# Patient Record
Sex: MALE | Race: WHITE | Employment: UNEMPLOYED | URBAN - METROPOLITAN AREA
[De-identification: names, ages, dates, MRNs, and addresses within clinical notes are randomized per-mention and may not be internally consistent; named-entity substitution may affect disease eponyms.]

---

## 2018-04-13 ENCOUNTER — HOSPITAL ENCOUNTER (EMERGENCY)
Age: 23
Discharge: HOME | End: 2018-04-13
Attending: EMERGENCY MEDICINE
Payer: SELF-PAY

## 2018-04-13 VITALS
DIASTOLIC BLOOD PRESSURE: 74 MMHG | BODY MASS INDEX: 30.1 KG/M2 | RESPIRATION RATE: 16 BRPM | OXYGEN SATURATION: 98 % | SYSTOLIC BLOOD PRESSURE: 134 MMHG | HEIGHT: 71 IN | TEMPERATURE: 98.1 F | WEIGHT: 215 LBS | HEART RATE: 60 BPM

## 2018-04-13 DIAGNOSIS — Z20.2 POSSIBLE EXPOSURE TO STD: Primary | ICD-10-CM

## 2018-04-13 PROCEDURE — 87491 CHLMYD TRACH DNA AMP PROBE: CPT | Performed by: EMERGENCY MEDICINE

## 2018-04-13 PROCEDURE — 99283 EMERGENCY DEPT VISIT LOW MDM: CPT | Performed by: EMERGENCY MEDICINE

## 2018-04-13 NOTE — DISCHARGE INSTRUCTIONS
Learning How to Use a Male Condom  What is a male condom? Condoms can be used to prevent pregnancy. They can also help protect against sexually transmitted infections (STIs). You must use a new condom every time you have sex. Condoms prevent pregnancy by keeping sperm and eggs apart. The condom holds the sperm so the sperm can't get into the vagina. A male condom is a tube of soft rubber or plastic with a closed end. It fits over the penis. There are many kinds of male condoms. Some condoms are lubricated. Some are ribbed. Most have a \"reservoir tip\" for holding the semen. You can also buy condoms of different sizes. How do you use a condom? Condoms work best if you follow these steps. · Use a new condom each time you have sex. · Check the condom's expiration date. Do not use it past that date. · When opening the condom wrapper, be sure not to poke a hole in the condom with your fingernails, teeth, or other sharp objects. · Put the condom on as soon as the penis is hard (erect) and before any sexual contact with your partner. ¨ First, hold the tip of the condom and squeeze out the air. This leaves room for the semen after you ejaculate. ¨ If you are not circumcised, pull down the loose skin from the head of the penis (foreskin) before you put on the condom. ¨ Hold on to the tip of the condom as you unroll the condom. Unroll it all the way down to the base of the penis. · After you ejaculate, hold on to the condom at the base of the penis, and withdraw from your partner while your penis is still erect. This will keep semen from spilling out of the condom. · Wash your hands after you handle a used condom. How do you buy and store condoms? · Male condoms may be available for free at family planning clinics. You can buy them without a prescription at drugstores, online, and in some grocery stores. · Keep condoms wrapped in their original packages until you are ready to use them.  Store them in a cool, dry place out of direct sunlight. · Don't keep rubber (latex) condoms in a glove compartment or other hot places for a long time. Heat weakens latex and increases the chance that the condom will break. · Don't use condoms in damaged packages. And don't use condoms that are brittle, sticky, or discolored, even if they are not past their expiration date. What else do you need to know? · To protect yourself and your partner from STIs, use a condom during vaginal, oral, or anal sex. · If the condom breaks or you think sperm may have leaked out into the vagina, the woman can use emergency contraception, such as the morning-after pill (Plan B). Emergency contraception can be used for up to 5 days after you have had sex. But it works best if you use it right away. · Use a male condom with another form of birth control. It's the best way to prevent pregnancy. ¨ You can use the condom with hormonal contraception, an intrauterine device (IUD), a diaphragm, a sponge, a shield, or a cervical cap. ¨ Don't use a male condom with a female condom. ¨ Use spermicide as its instructions say. Don't put spermicide inside a condom. · If you or your partner gets a rash or feels itchy after using a latex condom, talk to your doctor. You may have a latex allergy. Where can you learn more? Go to http://yenny-sierra.info/. Enter H470 in the search box to learn more about \"Learning How to Use a Male Condom. \"  Current as of: March 16, 2017  Content Version: 11.4  © 8246-1022 [employer]. Care instructions adapted under license by [employer] (which disclaims liability or warranty for this information). If you have questions about a medical condition or this instruction, always ask your healthcare professional. [address] any warranty or liability for your use of this information.

## 2018-04-13 NOTE — ED PROVIDER NOTES
HPI Comments: 23-year-old male presents with concern for STD. His girlfriend was treated for Chlamydia in August 2017. He states he was never tested or treated and now that she found out she was pregnant yesterday, he would like to be tested as he is concerned they may have reinfected each other. Neither him or his girlfriend have any symptoms. He has no abdominal pain or discharge and swelling and rash. Patient is a 20 y.o. male presenting with STD exposure. The history is provided by the patient. Exposure to STD   Pertinent negatives include no abdominal pain and no penile pain. History reviewed. No pertinent past medical history. History reviewed. No pertinent surgical history. History reviewed. No pertinent family history. Social History     Social History    Marital status: SINGLE     Spouse name: N/A    Number of children: N/A    Years of education: N/A     Occupational History    Not on file. Social History Main Topics    Smoking status: Former Smoker     Packs/day: 1.00     Quit date: 2/13/2018    Smokeless tobacco: Not on file    Alcohol use No    Drug use: No    Sexual activity: Not on file     Other Topics Concern    Not on file     Social History Narrative         ALLERGIES: Review of patient's allergies indicates no known allergies. Review of Systems   Constitutional: Negative for fever. Gastrointestinal: Negative for abdominal pain. Genitourinary: Negative for discharge, dysuria, hematuria, penile pain, penile swelling, scrotal swelling and testicular pain. Musculoskeletal: Negative for back pain. Skin: Negative for rash. Vitals:    04/13/18 0915   BP: 150/68   Pulse: 72   Resp: 16   SpO2: 98%   Weight: 97.5 kg (215 lb)   Height: 5' 11\" (1.803 m)            Physical Exam   Constitutional: He appears well-developed and well-nourished. HENT:   Head: Normocephalic and atraumatic.    Eyes: Conjunctivae are normal. Pupils are equal, round, and reactive to light. Neck: Neck supple. Cardiovascular: Normal rate. Pulmonary/Chest: Effort normal.   Abdominal: He exhibits no distension. Musculoskeletal: Normal range of motion. Neurological: He is alert. Skin: Skin is warm. Psychiatric: He has a normal mood and affect. Nursing note and vitals reviewed. MDM  Number of Diagnoses or Management Options  Possible exposure to STD:   Diagnosis management comments: Parts of this document were created using dragon voice recognition software. The chart has been reviewed but errors may still be present. Both partners asymptomatic and potential exposure occurred 8 months ago. We'll test both partners prior to treating    10:52 AM  Partners wet prep normal. No treatment at this time     I discussed the results of all labs, procedures, radiographs, and treatments with the patient and available family. Treatment plan is agreed upon and the patient is ready for discharge. Questions about treatment in the ED and differential diagnosis of presenting condition were answered. Patient was given verbal discharge instructions including, but not limited to, importance of returning to the emergency department for any concern of worsening or continued symptoms. Instructions were given to follow up with a primary care provider or specialist within 1-2 days. Adverse effects of medications, if prescribed, were discussed and patient was advised to refrain from significant physical activity until followed up by primary care physician and to not drive or operate heavy machinery after taking any sedating substances.            Amount and/or Complexity of Data Reviewed  Clinical lab tests: ordered          ED Course       Procedures

## 2018-04-13 NOTE — Clinical Note
Follow-up results of the STD test after 48 hours. If either partner is infected, both will require treatment.

## 2018-04-13 NOTE — ED NOTES
I have reviewed discharge instructions with the patient. The patient verbalized understanding. Patient left ED via Discharge Method: ambulatory to Home with self. Opportunity for questions and clarification provided. Patient given 0 scripts. To continue your aftercare when you leave the hospital, you may receive an automated call from our care team to check in on how you are doing. This is a free service and part of our promise to provide the best care and service to meet your aftercare needs.  If you have questions, or wish to unsubscribe from this service please call [phone]. Thank you for Choosing our [facility] Emergency Department.

## 2018-04-13 NOTE — LETTER
4/16/2018 Nagi Reyes [address] [facility] [id number] Dear  Darshan Farnsworth, You were recently seen in the Emergency Department of [facility] and had blood work or x-rays performed. We would like to discuss these with you. Please call the Emergency Department at your earliest convenience. If you were seen at [facility], please dial [phone], and if you were seen at [facility], please call [phone] to speak with one of our providers. Sincerely, Rickie Stark MD 
Medical Director Emergency Services, [address] [place] [address]  
[phone]/ [phone]

## 2018-04-16 LAB
C TRACH RRNA SPEC QL NAA+PROBE: POSITIVE
N GONORRHOEA RRNA SPEC QL NAA+PROBE: NEGATIVE
SPECIMEN SOURCE: ABNORMAL

## 2018-04-16 NOTE — PROGRESS NOTES
Patient was not treated prior to leaving ED. Patient will need to be treated. Attempted to call patient and number listed in the chart is a non working number.  Letter mailed

## 2018-04-17 NOTE — ED NOTES
04/17/18  10:43 AM  Discussed positive lab results with patient. He voiced no concerns at this time.

## 2018-09-24 ENCOUNTER — APPOINTMENT (OUTPATIENT)
Dept: GENERAL RADIOLOGY | Age: 23
End: 2018-09-24
Attending: EMERGENCY MEDICINE
Payer: SELF-PAY

## 2018-09-24 ENCOUNTER — HOSPITAL ENCOUNTER (EMERGENCY)
Age: 23
Discharge: HOME | End: 2018-09-24
Attending: EMERGENCY MEDICINE
Payer: SELF-PAY

## 2018-09-24 VITALS
OXYGEN SATURATION: 97 % | TEMPERATURE: 98.3 F | SYSTOLIC BLOOD PRESSURE: 150 MMHG | WEIGHT: 215 LBS | RESPIRATION RATE: 16 BRPM | HEART RATE: 96 BPM | DIASTOLIC BLOOD PRESSURE: 89 MMHG | HEIGHT: 71 IN | BODY MASS INDEX: 30.1 KG/M2

## 2018-09-24 DIAGNOSIS — M77.9 TENDONITIS: Primary | ICD-10-CM

## 2018-09-24 PROCEDURE — 73610 X-RAY EXAM OF ANKLE: CPT

## 2018-09-24 PROCEDURE — 99281 EMR DPT VST MAYX REQ PHY/QHP: CPT | Performed by: PHYSICIAN ASSISTANT

## 2018-09-24 RX ORDER — NAPROXEN 500 MG/1
500 TABLET ORAL 2 TIMES DAILY WITH MEALS
Qty: 20 TAB | Refills: 0 | Status: SHIPPED | OUTPATIENT
Start: 2018-09-24 | End: 2018-10-04

## 2018-09-24 NOTE — DISCHARGE INSTRUCTIONS
Tendon Injury (Tendinopathy): Care Instructions  Your Care Instructions    Tendons are tough, flexible tissues that connect muscle to bone. A tendon can hurt or get torn from overuse or aging, especially tendons in the shoulder, elbow, wrist, hip, knee, or ankle. Tendon injuries may be called tendinopathy or tendinitis. Tendon injuries can occur from any motion you have to repeat in a job, sports, or daily activities. Tennis elbow is one common tendon injury. You can treat most tendon problems with over-the-counter pain medicine, rest, changes in your activities, and physical therapy. Follow-up care is a key part of your treatment and safety. Be sure to make and go to all appointments, and call your doctor if you are having problems. It's also a good idea to know your test results and keep a list of the medicines you take. How can you care for yourself at home? · Rest the sore area. You may have to stop doing the activity that caused the tendon pain for a while. · Take an over-the-counter pain medicine, such as acetaminophen (Tylenol), ibuprofen (Advil, Motrin), or naproxen (Aleve). Read and follow all instructions on the label. · Do not take two or more pain medicines at the same time unless the doctor told you to. Many pain medicines have acetaminophen, which is Tylenol. Too much acetaminophen (Tylenol) can be harmful. · Put ice or a cold pack on the sore area for 10 to 20 minutes at a time. Try to do this every 1 to 2 hours for the next 3 days (when you are awake) or until any swelling goes down. Put a thin cloth between the ice and your skin. · Prop up the sore area on a pillow when you ice it or anytime you sit or lie down during the next 3 days. Try to keep it above the level of your heart. This will help reduce swelling.   · Follow your doctor's advice for wearing and caring for a sling, splint, or cast. In some cases, you may wear one of these for a while to help your tendon heal.  · Follow your doctor's advice for stretching and physical therapy. Gently move your joint through its full range of motion. This will prevent stiffness in your joint. · Go back to your activity slowly. Warm up before and stretch after the activity. You also can try making some changes. For example, if a sport caused your tendon pain, alternate the sport with another activity. If using a tool causes pain, switch hands or change your . Stop the activity if it hurts. After the activity, apply ice to prevent pain and swelling. · Do not smoke. Smoking can slow healing. If you need help quitting, talk to your doctor about stop-smoking programs and medicines. These can increase your chances of quitting for good. When should you call for help? Watch closely for changes in your health, and be sure to contact your doctor if:    · Your pain gets worse.     · You do not get better as expected. Where can you learn more? Go to http://yenny-sierra.info/. Enter A157 in the search box to learn more about \"Tendon Injury (Tendinopathy): Care Instructions. \"  Current as of: November 29, 2017  Content Version: 11.7  © 3508-8317 [employer]. Care instructions adapted under license by [employer] (which disclaims liability or warranty for this information). If you have questions about a medical condition or this instruction, always ask your healthcare professional. [address] any warranty or liability for your use of this information.

## 2018-09-24 NOTE — ED PROVIDER NOTES
HPI Comments: Pt states he went to bed fine, woke with left ankle pain, no h/o same, no trauma, no meds taken for pain, missed work Patient is a 20 y.o. male presenting with ankle pain. The history is provided by the patient. Ankle Pain This is a new problem. The current episode started 6 to 12 hours ago. The problem occurs constantly. The problem has not changed since onset. The pain is present in the left ankle. The quality of the pain is described as aching. The pain is at a severity of 8/10. The pain is mild. Associated symptoms include stiffness. The symptoms are aggravated by palpation and activity. He has tried nothing for the symptoms. The treatment provided no relief. There has been no history of extremity trauma. History reviewed. No pertinent past medical history. History reviewed. No pertinent surgical history. History reviewed. No pertinent family history. Social History Social History  Marital status: SINGLE Spouse name: N/A  
 Number of children: N/A  
 Years of education: N/A Occupational History  Not on file. Social History Main Topics  Smoking status: Former Smoker Packs/day: 1.00 Quit date: 2/13/2018  Smokeless tobacco: Not on file  Alcohol use No  
 Drug use: No  
 Sexual activity: Not on file Other Topics Concern  Not on file Social History Narrative ALLERGIES: Review of patient's allergies indicates no known allergies. Review of Systems Musculoskeletal: Positive for stiffness. All other systems reviewed and are negative. Vitals:  
 09/24/18 1529 BP: 150/89 Pulse: 96  
Resp: 16 Temp: 98.3 °F (36.8 °C) SpO2: 97% Weight: 97.5 kg (215 lb) Height: 5' 11\" (1.803 m) Physical Exam  
Constitutional: He is oriented to person, place, and time. He appears well-developed and well-nourished. No distress. HENT:  
Head: Normocephalic and atraumatic. Eyes: Conjunctivae and EOM are normal. Pupils are equal, round, and reactive to light. Neck: Normal range of motion. Neck supple. Cardiovascular: Normal rate and regular rhythm. Pulmonary/Chest: Effort normal and breath sounds normal. No respiratory distress. He has no wheezes. Abdominal: Soft. Bowel sounds are normal.  
Musculoskeletal: He exhibits tenderness. He exhibits no edema or deformity. Diffuse soreness about anterior lateral ankle, no swelling or redness, intact pulse, calf soft Neurological: He is alert and oriented to person, place, and time. Skin: Skin is warm. Nursing note and vitals reviewed. MDM Number of Diagnoses or Management Options Diagnosis management comments: Left ankle x rays - Will treat possible tendonitis with naprosyn, work note given Amount and/or Complexity of Data Reviewed Tests in the radiology section of CPT®: ordered and reviewed Review and summarize past medical records: yes Risk of Complications, Morbidity, and/or Mortality Presenting problems: low Diagnostic procedures: low Management options: low Patient Progress Patient progress: improved ED Course Procedures

## 2018-09-24 NOTE — LETTER
[address] [facility] EMERGENCY DEPT 
[facility] [address] [place] [id number] 
[phone] Work/School Note Date: 9/24/2018 To Whom It May concern: Charlene Abarca was seen and treated today in the emergency room by the following provider(s): 
Attending Provider: Alissa Chaudhari MD 
Physician Assistant: ELE Heaton Ra. Charlene Abarca may return to work on 9-25-18. Sincerely, ELE Heaton Ra